# Patient Record
Sex: MALE | Race: WHITE | NOT HISPANIC OR LATINO | Employment: FULL TIME | URBAN - METROPOLITAN AREA
[De-identification: names, ages, dates, MRNs, and addresses within clinical notes are randomized per-mention and may not be internally consistent; named-entity substitution may affect disease eponyms.]

---

## 2019-04-29 ENCOUNTER — OFFICE VISIT (OUTPATIENT)
Dept: URGENT CARE | Facility: CLINIC | Age: 54
End: 2019-04-29
Payer: COMMERCIAL

## 2019-04-29 VITALS
TEMPERATURE: 98 F | HEART RATE: 85 BPM | SYSTOLIC BLOOD PRESSURE: 124 MMHG | RESPIRATION RATE: 16 BRPM | WEIGHT: 235 LBS | HEIGHT: 74 IN | DIASTOLIC BLOOD PRESSURE: 80 MMHG | OXYGEN SATURATION: 97 % | BODY MASS INDEX: 30.16 KG/M2

## 2019-04-29 DIAGNOSIS — E10.621 DIABETIC ULCER OF RIGHT FOOT ASSOCIATED WITH TYPE 1 DIABETES MELLITUS, UNSPECIFIED PART OF FOOT, UNSPECIFIED ULCER STAGE: Primary | ICD-10-CM

## 2019-04-29 DIAGNOSIS — L97.519 DIABETIC ULCER OF RIGHT FOOT ASSOCIATED WITH TYPE 1 DIABETES MELLITUS, UNSPECIFIED PART OF FOOT, UNSPECIFIED ULCER STAGE: Primary | ICD-10-CM

## 2019-04-29 PROCEDURE — 99203 PR OFFICE/OUTPT VISIT, NEW, LEVL III, 30-44 MIN: ICD-10-PCS | Mod: 25,S$GLB,, | Performed by: NURSE PRACTITIONER

## 2019-04-29 PROCEDURE — 99203 OFFICE O/P NEW LOW 30 MIN: CPT | Mod: 25,S$GLB,, | Performed by: NURSE PRACTITIONER

## 2019-04-29 PROCEDURE — 96372 THER/PROPH/DIAG INJ SC/IM: CPT | Mod: S$GLB,,, | Performed by: EMERGENCY MEDICINE

## 2019-04-29 PROCEDURE — 96372 PR INJECTION,THERAP/PROPH/DIAG2ST, IM OR SUBCUT: ICD-10-PCS | Mod: S$GLB,,, | Performed by: EMERGENCY MEDICINE

## 2019-04-29 RX ORDER — INSULIN LISPRO 100 [IU]/ML
25 INJECTION, SOLUTION INTRAVENOUS; SUBCUTANEOUS
COMMUNITY
Start: 2014-11-30

## 2019-04-29 RX ORDER — CEFTRIAXONE 1 G/1
1 INJECTION, POWDER, FOR SOLUTION INTRAMUSCULAR; INTRAVENOUS
Status: COMPLETED | OUTPATIENT
Start: 2019-04-29 | End: 2019-04-29

## 2019-04-29 RX ORDER — INSULIN GLARGINE 100 [IU]/ML
36 INJECTION, SOLUTION SUBCUTANEOUS
COMMUNITY
Start: 2018-01-22

## 2019-04-29 RX ORDER — CEPHALEXIN 500 MG/1
500 CAPSULE ORAL EVERY 12 HOURS
Qty: 20 CAPSULE | Refills: 0 | Status: SHIPPED | OUTPATIENT
Start: 2019-04-29 | End: 2019-05-09

## 2019-04-29 RX ORDER — KETOROLAC TROMETHAMINE 30 MG/ML
30 INJECTION, SOLUTION INTRAMUSCULAR; INTRAVENOUS
Status: COMPLETED | OUTPATIENT
Start: 2019-04-29 | End: 2019-04-29

## 2019-04-29 RX ORDER — ALBUTEROL SULFATE 90 UG/1
2 AEROSOL, METERED RESPIRATORY (INHALATION) EVERY 4 HOURS PRN
COMMUNITY

## 2019-04-29 RX ADMIN — KETOROLAC TROMETHAMINE 30 MG: 30 INJECTION, SOLUTION INTRAMUSCULAR; INTRAVENOUS at 02:04

## 2019-04-29 RX ADMIN — CEFTRIAXONE 1 G: 1 INJECTION, POWDER, FOR SOLUTION INTRAMUSCULAR; INTRAVENOUS at 02:04

## 2019-04-29 NOTE — PROGRESS NOTES
"Subjective:       Patient ID: Alin Callahan is a 53 y.o. male.    Vitals:  height is 6' 2" (1.88 m) and weight is 106.6 kg (235 lb). His temperature is 97.6 °F (36.4 °C). His blood pressure is 124/80 and his pulse is 85. His respiration is 16 and oxygen saturation is 97%.     Chief Complaint: Nasal Congestion and Foot Swelling    Pt is c/o nasal congestion week, denies cough, took nothing otc. Pt had tubes put in the left ear 2 weeks ago. Pt was treated a month and a half ago for bilateral foot cellulitis. Pt was given rocephin and cipro, completed dosage, but still is having foot swelling and weeping blistering.      Constitution: Positive for fatigue. Negative for chills, sweating and fever.   HENT: Positive for ear discharge, congestion and postnasal drip. Negative for ear pain, sinus pain, sinus pressure, sore throat and voice change.    Neck: Negative for painful lymph nodes.   Eyes: Negative for eye redness.   Respiratory: Positive for asthma. Negative for chest tightness, cough, sputum production, bloody sputum, COPD, shortness of breath, stridor and wheezing.    Gastrointestinal: Negative for nausea and vomiting.   Musculoskeletal: Negative for muscle ache.   Skin: Negative for rash and erythema.   Allergic/Immunologic: Positive for asthma. Negative for seasonal allergies.   Hematologic/Lymphatic: Negative for swollen lymph nodes.       Objective:      Physical Exam   Constitutional: He is oriented to person, place, and time. He appears well-developed and well-nourished. He is cooperative.  Non-toxic appearance. He does not appear ill. No distress.   HENT:   Head: Normocephalic and atraumatic. Head is without abrasion, without contusion and without laceration.   Right Ear: Hearing, tympanic membrane, external ear and ear canal normal.   Left Ear: Hearing, tympanic membrane, external ear and ear canal normal.   Nose: Nose normal. No mucosal edema, rhinorrhea or nasal deformity. No epistaxis. Right sinus " exhibits no maxillary sinus tenderness and no frontal sinus tenderness. Left sinus exhibits no maxillary sinus tenderness and no frontal sinus tenderness.   Mouth/Throat: Uvula is midline, oropharynx is clear and moist and mucous membranes are normal. No trismus in the jaw. Normal dentition. No uvula swelling. No posterior oropharyngeal erythema.   Eyes: Pupils are equal, round, and reactive to light. Conjunctivae, EOM and lids are normal. Right eye exhibits no discharge. Left eye exhibits no discharge. No scleral icterus.   Sclera clear bilat   Neck: Trachea normal, normal range of motion, full passive range of motion without pain and phonation normal. Neck supple.   Cardiovascular: Normal rate, regular rhythm, normal heart sounds, intact distal pulses and normal pulses.   Pulmonary/Chest: Effort normal and breath sounds normal. No stridor. No respiratory distress.   Abdominal: Soft. Normal appearance and bowel sounds are normal. He exhibits no distension, no pulsatile midline mass and no mass. There is no tenderness.   Musculoskeletal: Normal range of motion. He exhibits no edema or deformity.        Feet:    Neurological: He is alert and oriented to person, place, and time. He exhibits normal muscle tone. Coordination normal.   Skin: Skin is warm, dry and intact. Capillary refill takes less than 2 seconds. No abrasion, no bruising, no burn, no ecchymosis, no laceration, no lesion and no rash noted. He is not diaphoretic. No erythema. No pallor.   Psychiatric: He has a normal mood and affect. His speech is normal and behavior is normal. Judgment and thought content normal. Cognition and memory are normal.   Nursing note and vitals reviewed.            Assessment:       1. Diabetic ulcer of right foot associated with type 1 diabetes mellitus, unspecified part of foot, unspecified ulcer stage        Plan:         Diabetic ulcer of right foot associated with type 1 diabetes mellitus, unspecified part of foot,  unspecified ulcer stage  -     cefTRIAXone injection 1 g  -     ketorolac injection 30 mg  -     cephALEXin (KEFLEX) 500 MG capsule; Take 1 capsule (500 mg total) by mouth every 12 (twelve) hours. for 10 days  Dispense: 20 capsule; Refill: 0      Patient Instructions   Patient take antibiotics until completion.  Tylenol as needed for pain. Educated patient on increasing signs of infection such as increased purulent drainage, tenderness and fever.  If patient notices these signs he needs to follow up immediately in the emergency room    Patient educated to follow up with primary as soon as possible.            Diabetes: Treating Severe Foot Infections    Uncontrolled diabetes and diabetes complications make it harder for the body to heal. Even minor foot problems can develop into serious infections. If not treated, infections can lead to amputation. In some cases, they can even become life-threatening. Prompt treatment by your healthcare provider is needed to protect your foot and restore your health.  Get treatment  In some cases, infections can spread through the feet and up the leg. To treat a severe infection, you may be hospitalized and given intravenous (IV) antibiotics. You may also be referred to healthcare providers who specialize in treating infections. If the infection is a serious risk to your health, surgery may be recommended.  The goals of surgery  The goal of surgery is to remove the infection and protect your foot or leg. Some surgeries remove a small amount of dead tissue from the infected area. In some cases, toes or larger amounts of tissue may be removed. Surgery may be done in a hospital or wound care facility. The length of your stay depends on the surgery and how well youre healing. During recovery, you will likely need to limit activity for a while. You may also have visits from a home healthcare nurse. Be sure to see your healthcare provider for follow-up appointments.  Wound  care  Suggestions include the following:  · Regular wound care after surgery helps keep your foot free of infection and aids healing.  · Change your dressing every 6 hours, or as directed by your healthcare provider.  · You may need IV (intravenous) antibiotics to help control the infection. Other medicines may be used to help your foot heal more quickly.  · Do what you can to keep your blood sugar within your target range. This will also help wound healing.   · A home care nurse may shorten your hospital stay by helping with your dressings or IV antibiotics at home.  · If needed, your healthcare provider may refer you to a wound care facility. These are medical facilities that specialize in treating ulcers and infections that are hard to heal. While youre there, you may work with several kinds of healthcare providers. You may also be given antibiotics or other medicines that help fight infection. Part of your treatment also includes learning to care for the wound at home.  · You may be told to keep your foot elevated as much as possible. You may also be told to avoid putting weight on the foot.  Date Last Reviewed: 6/1/2016 © 2000-2017 [employer]. [address] [place] [address], [place] [address]. All rights reserved. This information is not intended as a substitute for professional medical care. Always follow your healthcare professional's instructions.

## 2019-04-29 NOTE — PATIENT INSTRUCTIONS
Patient take antibiotics until completion.  Tylenol as needed for pain. Educated patient on increasing signs of infection such as increased purulent drainage, tenderness and fever.  If patient notices these signs he needs to follow up immediately in the emergency room    Patient educated to follow up with primary as soon as possible.            Diabetes: Treating Severe Foot Infections    Uncontrolled diabetes and diabetes complications make it harder for the body to heal. Even minor foot problems can develop into serious infections. If not treated, infections can lead to amputation. In some cases, they can even become life-threatening. Prompt treatment by your healthcare provider is needed to protect your foot and restore your health.  Get treatment  In some cases, infections can spread through the feet and up the leg. To treat a severe infection, you may be hospitalized and given intravenous (IV) antibiotics. You may also be referred to healthcare providers who specialize in treating infections. If the infection is a serious risk to your health, surgery may be recommended.  The goals of surgery  The goal of surgery is to remove the infection and protect your foot or leg. Some surgeries remove a small amount of dead tissue from the infected area. In some cases, toes or larger amounts of tissue may be removed. Surgery may be done in a hospital or wound care facility. The length of your stay depends on the surgery and how well youre healing. During recovery, you will likely need to limit activity for a while. You may also have visits from a home healthcare nurse. Be sure to see your healthcare provider for follow-up appointments.  Wound care  Suggestions include the following:  · Regular wound care after surgery helps keep your foot free of infection and aids healing.  · Change your dressing every 6 hours, or as directed by your healthcare provider.  · You may need IV (intravenous) antibiotics to help control the  infection. Other medicines may be used to help your foot heal more quickly.  · Do what you can to keep your blood sugar within your target range. This will also help wound healing.   · A home care nurse may shorten your hospital stay by helping with your dressings or IV antibiotics at home.  · If needed, your healthcare provider may refer you to a wound care facility. These are medical facilities that specialize in treating ulcers and infections that are hard to heal. While youre there, you may work with several kinds of healthcare providers. You may also be given antibiotics or other medicines that help fight infection. Part of your treatment also includes learning to care for the wound at home.  · You may be told to keep your foot elevated as much as possible. You may also be told to avoid putting weight on the foot.  Date Last Reviewed: 6/1/2016 © 2000-2017 [employer]. [address] [place] [address], [place] [address]. All rights reserved. This information is not intended as a substitute for professional medical care. Always follow your healthcare professional's instructions.

## 2019-05-02 ENCOUNTER — TELEPHONE (OUTPATIENT)
Dept: URGENT CARE | Facility: CLINIC | Age: 54
End: 2019-05-02